# Patient Record
Sex: MALE
[De-identification: names, ages, dates, MRNs, and addresses within clinical notes are randomized per-mention and may not be internally consistent; named-entity substitution may affect disease eponyms.]

---

## 2018-08-28 ENCOUNTER — HOSPITAL ENCOUNTER (OUTPATIENT)
Dept: HOSPITAL 14 - H.OPSURG | Age: 33
Discharge: HOME | End: 2018-08-28
Attending: ORTHOPAEDIC SURGERY
Payer: COMMERCIAL

## 2018-08-28 VITALS
TEMPERATURE: 98 F | SYSTOLIC BLOOD PRESSURE: 119 MMHG | OXYGEN SATURATION: 98 % | HEART RATE: 82 BPM | DIASTOLIC BLOOD PRESSURE: 68 MMHG

## 2018-08-28 VITALS — RESPIRATION RATE: 18 BRPM

## 2018-08-28 VITALS — BODY MASS INDEX: 25 KG/M2
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
## 2018-08-28 DIAGNOSIS — S82.831A: Primary | ICD-10-CM
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
## 2018-08-28 PROCEDURE — 73610 X-RAY EXAM OF ANKLE: CPT

## 2018-08-28 PROCEDURE — 27792 TREATMENT OF ANKLE FRACTURE: CPT

## 2018-08-28 RX ADMIN — HYDROMORPHONE HYDROCHLORIDE PRN MG: 1 INJECTION, SOLUTION INTRAMUSCULAR; INTRAVENOUS; SUBCUTANEOUS at 10:35

## 2018-08-28 RX ADMIN — HYDROMORPHONE HYDROCHLORIDE PRN MG: 1 INJECTION, SOLUTION INTRAMUSCULAR; INTRAVENOUS; SUBCUTANEOUS at 10:23

## 2018-08-28 NOTE — PCM.SURG1
Surgeon's Initial Post Op Note





- Surgeon's Notes


Surgeon: Dr. Terrazas


Assistant: Seth Hinson (podiatric resident 1), TRISTEN Moon


Type of Anesthesia: General Endo


Anesthesia Administered By: Dr. Carter


Pre-Operative Diagnosis: Right distal fibular fracture


Operative Findings: as above


Post-Operative Diagnosis: right distal fibular fracture


Operation Performed: ORIF of the right distal fibular fracture


Specimen/Specimens Removed: none


Estimated Blood Loss: EBL {In ML}: 15


Blood Products Given: N/A


Drains Used: No Drains


Post-Op Condition: Good


Date of Surgery/Procedure: 08/28/18


Time of Surgery/Procedure: 07:30 ((time in room/ incision 8:15/end time 9:25))

## 2018-08-28 NOTE — CP.SDSHP
Same Day Surgery [facility]





- History


Proposed Procedure: Right distal fibular fracture ORIF


Pre-Op Diagnosis: Right distal fibular fracture





- Previous Medical/Surgical History


Comments: no PMH


Previous Surgical History: left shoulder arthroscopy





- Allergies


Allergies: 


Allergies





No Known Allergies Allergy (Verified 08/28/18 06:46)


 











- Physical Exam


Vital Signs: 


 Vital Signs











  08/28/18 08/28/18





  06:35 06:38


 


Temperature 98.1 F 


 


Pulse Rate 47 L 47 L


 


Respiratory 20 





Rate  


 


Blood Pressure 112/64 


 


O2 Sat by Pulse 97 





Oximetry  











Mental Status: Alert & Oriented x3


Heart: WNL


Lungs: WNL


GI: WNL





- {Optional Preform as Required}


Ortho: Other (+DP pulse, sensation intact, +ROM toes)


Other Pertinent Findings: xrays from outside facility provided by patient.  [place] 

 patient report reviewed, no CDS. Patient counseled on the risks of addiction

, physical or psychological dependence, and overdose associated with opioid 

drugs and the danger of taking opioid drugs with alcohol and other central 

nervous system depressants, and cautioned patient on storage and disposal.





- Impression


Impression: 32M with right ankle injury playing softball yesterday found to 

have right distal fibular fracture for ORIF


Pt. Evaluated Today:Candidate for Anesthesia & Procedure: Yes





- Date & Time


Date: 08/28/18


Time: 07:10





Short Stay Discharge





- Short Stay Discharge


Admitting Diagnosis/Reason for Visit: RT FIBULAR FX


Disposition: HOME/ ROUTINE


Referrals: 


Gaudencio Terrazas III, MD [Primary Care Provider] - 





Past Patient History





- Past Medical History & Family History


Past Medical History?: No


Past Family History: Reviewed and not pertinent





- Past Social History


Smoking Status: Never Smoked





- CARDIAC


Hx Cardiac Disorders: No





- PULMONARY


Hx Respiratory Disorders: No





- NEUROLOGICAL


Hx Neurological Disorder: No





- HEENT


Hx HEENT Problems: No





- RENAL


Hx Chronic Kidney Disease: No





- ENDOCRINE/METABOLIC


Hx Endocrine Disorders: No





- HEMATOLOGICAL/ONCOLOGICAL


Hx Blood Disorders: No





- INTEGUMENTARY


Hx Dermatological Problems: No





- MUSCULOSKELETAL/RHEUMATOLOGICAL


Hx Musculoskeletal Disorders: No





- GASTROINTESTINAL


Hx Gastrointestinal Disorders: No





- GENITOURINARY/GYNECOLOGICAL


Hx Genitourinary Disorders: No





- PSYCHIATRIC


Hx Psychophysiologic Disorder: No





- SURGICAL HISTORY


Hx Surgeries: Yes


Hx Orthopedic Surgery: Yes (left shoulder rotator cuff)





- ANESTHESIA


Hx Anesthesia: Yes


Hx Anesthesia Reactions: No

## 2018-08-28 NOTE — RAD
Date of service: 



08/28/2018



PROCEDURE:  Right Ankle Radiographs.



HISTORY:

s/p right ankle surgery  



COMPARISON:

None



FINDINGS:



BONES:

Distal fibular fixation plate and screws with osseous components in 

anatomic alignment. 



JOINTS:

Normal. No osteoarthritis. Ankle mortise maintained. Talar dome intact



SOFT TISSUES:

Mild lateral malleolar soft tissue swelling. 



OTHER FINDINGS:

None.



IMPRESSION:

Status post distal right fibular ORIF.

## 2018-08-28 NOTE — PCM.ANESB2
Popliteal Nerve Block





- Popliteal Nerve Block


Date of Procedure: 08/28/18


Anesthesiologist: Edison Carter


Pre-Procedure Diagnosis: R fibula fracture


Post-Procedure Diagnosis: R fibula fracture


Procedure Performed: Popliteal Nerve Block Right





- Procedure


Popliteal Nerve Block: 


This procedure was explained to the patient that it is for post-operative pain 

management. Consent was obtained after a thorough discussion with the patient 

regarding the benefits and possible complications of local anesthetic block of 

the sciatic nerve at the popliteal level. The patient was brought to the 

operating room and standard monitors are applied. Time-out was held with the 

circulating nurse to confirm the correct surgery and the appropriate block. 

After completion of the surgical procedure, patient's operative leg was gently 

raised and supported and the groove in between the biceps femoris and vastus 

lateralis muscles was carefully palpated. The skin approximately 8cm above the 

popliteal crease was then marked. The ultrasound transducer was then applied to 

the posterior thigh approximately 8cm above the popliteal crease in the 

transverse plane and the sciatic nerve before its division was visualized 

lateral to the popliteal artery and in between the bicep femoris and 

semimembranosus/semitendinosus muscles. After identification, the lateral 

portion of the thigh was prepped with Chlorhexidine. 





At this point, a # 21 gauge Stimuplex insulated 4 inch needle was inserted into 

pre-marked area and advanced in a perpendicular direction. The needle was 

inserted above the ultrasound transducer in-plane towards the sciatic nerve in 

a lateral-to-medial direction. Needle advancement was performed carefully under 

direct ultrasound visualization. After repeated negative aspiration, 30cc of 

0.5 % ropivacaine was injected in 5cc aliquots. Under ultrasound guidance the 

local anesthetics were observed surrounding sciatic nerve . The needle was 

removed intact and sterile dressing was applied.





The patient tolerated the popliteal nerve block well with stable vital signs 

and was subsequently prepared for the surgery.

## 2018-08-29 NOTE — OP
Copied To:  Gaudencio Terrazas MD

Attending MD:  Gaudencio Terrazas MD



PROCEDURE DATE:  08/28/2018



SURGEON:  Gaudencio Terrazas MD



FIRST ASSISTANT:  Dr. Seth Carmona, podiatry resident.



SECOND ASSISTANT:  Rosie Vaughan, certified registered nursing first

assistant.



ANESTHESIA:  General endotracheal anesthesia, Dr. Carter.



COMPLICATIONS:  No complications.



DRAINS:  No drains.



OPERATIVE PROCEDURES:

1.  Open reduction and internal fixation of the fibula fracture.

2.  Primary plastic closure.

3.  Application of Allan Beaulieu compression dressing and posterior splint.

4.  Positioning of fluoroscope and interpretation of video images.



OPERATIVE INDICATIONS:  Turner Sanchez presents after an injury

approximately one week ago.  The patient had initially been untreated and

had marked swelling.  The patient was seen by me on Saturday at [facility] with Dr. Carmona where a posterior splint was

applied.  Elevation was ensured, icing and the patient was taken to Surgery

as a relative emergency at 07:00 a.m. Tuesday morning.  Pros, cons, risks

and benefits of surgical approach was discussed.  The possibility of

mechanical failure, infection, thromboembolic disease, secondary or

tertiary surgery was discussed.  The patient can no longer withstand the

discomfort.



OPERATIVE PROCEDURE:  After having obtained informed consent, after having

identified the side, site and procedure and a critical pause/time-out,

after the satisfactory induction of the anesthetic, the patient identified

as Turner Sanchez in the supine position with all bony prominences well

padded.  The lower extremity was prepped and free draped in usual fashion

for lower extremity surgery.  Under the surgeon's direction, the

fluoroscope was positioned, video images were generated, therapeutic

decisions were made therefrom.  At this point after exsanguinating the limb

using a 6-inch Esmarch bandage, the tourniquet which had been applied was

inflated to 350 mmHg.  An incision was described in the lateral aspect of

the fibula of approximately 5 inches in extent.  The skin incision was

carried down through the skin, subcutaneous tissue.  Great care was taken

to avoid injury to the sural nerve.  Stay sutures were placed, fracture

site was identified.  Using sharp dissection with #15 blade, the periosteum

was elevated anteriorly and posteriorly.  The fracture site was identified

and an one quarter-inch straight osteotome was introduced into the fracture

and the fracture was displaced.  All healing callus was removed and an

anatomic reduction was accomplished with the knee flexed somewhat and with

the ankle in dorsiflexion with towel clip bone holding clamp. 

Interfragmentary compression was offered 3.5 drill bit followed by 2.0

drill bit followed by drilling and sounding with a depth gauge and the

appropriate size interfragmentary screw was placed.  The fixation was found

to be excellent.  At this point in time, the AO distal fibular locking

plate was placed and each sequential drill hole was drilled, sounded and

the appropriate size screw were placed.  Two screws were placed in the

locking mode proximally, two in the normal mode through the all holes to

allow compression of the fracture and reduction of the fracture and

distally all holes were filled except for one in the locking mode. 

Verification of position was offered on AP and lateral image

intensification views.  At this point in time, autograft bone grafting with

the DBX was accomplished.  The wound had been accomplished prior. 

Autograft bone grafting was accomplished.  Periosteum was closed with 0

Vicryl.  Again great care was taken not to injure the sural nerve.  Closure

was in layers with interrupted Vicryl and 2-0 Quill plastic closure. 

Allan Beaulieu compression dressing and posterior splint was applied. 

Steri-Strips were applied.







__________________________________________

Gaudencio Terrazas MD



DD:  08/29/2018 13:42:16

DT:  08/29/2018 13:46:04

Job # [id number]

## 2018-08-31 NOTE — RAD
Date of service: 



08/28/2018



PROCEDURE:  Intraoperative Fluoroscopy. 



HISTORY:

RIGHT ANKLE ORIF



FINDINGS:

Fluoroscopic assistance was provided. Fluoroscopy time = 9.6 seconds. 

 Please refer to the operative report from JOHAN Liriano.